# Patient Record
Sex: MALE | Race: WHITE | NOT HISPANIC OR LATINO | Employment: FULL TIME | URBAN - METROPOLITAN AREA
[De-identification: names, ages, dates, MRNs, and addresses within clinical notes are randomized per-mention and may not be internally consistent; named-entity substitution may affect disease eponyms.]

---

## 2025-03-24 ENCOUNTER — OFFICE VISIT (OUTPATIENT)
Dept: URGENT CARE | Age: 30
End: 2025-03-24
Payer: COMMERCIAL

## 2025-03-24 VITALS
WEIGHT: 155 LBS | HEART RATE: 64 BPM | RESPIRATION RATE: 16 BRPM | OXYGEN SATURATION: 99 % | BODY MASS INDEX: 22.19 KG/M2 | TEMPERATURE: 98.7 F | HEIGHT: 70 IN | DIASTOLIC BLOOD PRESSURE: 82 MMHG | SYSTOLIC BLOOD PRESSURE: 127 MMHG

## 2025-03-24 DIAGNOSIS — R07.9 CHEST PAIN, UNSPECIFIED TYPE: Primary | ICD-10-CM

## 2025-03-24 PROBLEM — R00.2 PALPITATIONS: Status: ACTIVE | Noted: 2025-03-24

## 2025-03-24 PROCEDURE — 99204 OFFICE O/P NEW MOD 45 MIN: CPT | Performed by: PHYSICIAN ASSISTANT

## 2025-03-24 PROCEDURE — 3008F BODY MASS INDEX DOCD: CPT | Performed by: PHYSICIAN ASSISTANT

## 2025-03-24 ASSESSMENT — PATIENT HEALTH QUESTIONNAIRE - PHQ9
2. FEELING DOWN, DEPRESSED OR HOPELESS: NOT AT ALL
SUM OF ALL RESPONSES TO PHQ9 QUESTIONS 1 AND 2: 0
1. LITTLE INTEREST OR PLEASURE IN DOING THINGS: NOT AT ALL

## 2025-03-24 ASSESSMENT — PAIN SCALES - GENERAL: PAINLEVEL_OUTOF10: 2

## 2025-03-24 NOTE — PROGRESS NOTES
"Subjective   Patient ID: Barber Agustin is a 29 y.o. male. They present today with a chief complaint of Chest Pain (X1 week pain is constant ).    History of Present Illness    Chest Pain    29-year-old patient presents to clinic with complaints of anterior fold chest pain with associated intermittent shortness of breath and palpitations ongoing for the past week which comes and goes and lasts about 1 to 3 hours and can be found at rest and with activity.   Reports this has happened in the past since 2021 and generally episodes present around February and March.  Reports has seen a cardiologist in the past and has had normal echo.  Reports most recent episode was this morning about 30 minutes ago.    Reports has tried antiacids as episodes generally occur worse at night.  Reports antiacids sometimes help and sometimes do not help. Denies illegal drug use, excessive caffeine, history of cardiac problems, history of arrhythmias, family history of cardiac problems, sudden cardiac death in the family, unilateral neuro weakness, vision changes, nausea, vomiting, diaphoresis.  Past Medical History  Allergies as of 03/24/2025    (No Known Allergies)       (Not in a hospital admission)       History reviewed. No pertinent past medical history.    History reviewed. No pertinent surgical history.     reports that he has been smoking cigarettes. He uses smokeless tobacco.    Review of Systems         ROS negative with the exception as noted on HPI                         Objective    Vitals:    03/24/25 0819   BP: 127/82   BP Location: Left arm   Patient Position: Sitting   BP Cuff Size: Adult   Pulse: 64   Resp: 16   Temp: 37.1 °C (98.7 °F)   SpO2: 99%   Weight: 70.3 kg (155 lb)   Height: 1.778 m (5' 10\")     No LMP for male patient.    Physical Exam  Constitutional:       Appearance: Normal appearance.   HENT:      Head: Normocephalic and atraumatic.   Neck:      Vascular: No carotid bruit.   Cardiovascular:      Rate and " Rhythm: Normal rate and regular rhythm.      Pulses:           Carotid pulses are 2+ on the right side and 2+ on the left side.       Radial pulses are 2+ on the right side and 2+ on the left side.        Dorsalis pedis pulses are 2+ on the right side and 2+ on the left side.      Heart sounds: Normal heart sounds. No murmur heard.  Pulmonary:      Effort: Pulmonary effort is normal.      Breath sounds: Normal breath sounds and air entry. No stridor, decreased air movement or transmitted upper airway sounds. No decreased breath sounds, wheezing, rhonchi or rales.   Musculoskeletal:      Right lower leg: No edema.      Left lower leg: No edema.   Skin:     General: Skin is warm and dry.   Neurological:      General: No focal deficit present.      Mental Status: He is alert and oriented to person, place, and time.      Cranial Nerves: No cranial nerve deficit.         Procedures    Point of Care Test & Imaging Results from this visit  No results found for this visit on 03/24/25.   No results found.    Diagnostic study results (if any) were reviewed by Meggan Donnelly PA-C.    Assessment/Plan   Allergies, medications, history, and pertinent labs/EKGs/Imaging reviewed by Meggan Donnelly PA-C.   anterior fold chest pain with associated intermittent shortness of breath and palpitations ongoing for the past week which comes and goes and lasts about 1 to 3 hours and can be found at rest and with activity. ECG performed in clinic and reviewed with pt. ECG results:  Bradycardia at a rate of 58.  No P wave abnormalities.  No ST elevations or depressions.  No axis deviation.   Unchanged from previous ECG. No illegal drug use. No excessive caffeine. Discussed with pt. If pt. experiences worsening or changing chest pain, shortness of breath, palpitations, dizziness, pre-syncope, syncope, diaphoresis pt. Should call 911 or proceed to the ED immediately. Cardiac referral provided. F/U with cardiology encouraged. PCP  referral placed. Case discussed with supervising physician.   Medical Decision Making      Orders and Diagnoses  Diagnoses and all orders for this visit:  Chest pain, unspecified type  -     ECG 12 lead (Clinic Performed)  -     Referral to Cardiology; Future      Medical Admin Record      Patient disposition: Home    Electronically signed by Meggan Donnelly PA-C  9:17 AM

## 2025-03-24 NOTE — PATIENT INSTRUCTIONS
If you experience any of the following call 911 or proceed to the ED immediately  -Pain, pressure, or discomfort in the center of the chest  -Pain or discomfort in other parts of the upper body, including the shoulders, arms, back, neck, jaw, or stomach  -Shortness of breath  -Nausea, vomiting, burping, or heartburn  -Sweating or having cold, clammy skin  -Racing or uneven heart rate  -Feeling dizzy or lightheaded, or even fainting

## 2025-03-25 ENCOUNTER — TELEMEDICINE (OUTPATIENT)
Dept: PRIMARY CARE | Facility: CLINIC | Age: 30
End: 2025-03-25
Payer: COMMERCIAL

## 2025-03-25 ENCOUNTER — OFFICE VISIT (OUTPATIENT)
Dept: CARDIOLOGY | Facility: CLINIC | Age: 30
End: 2025-03-25
Payer: COMMERCIAL

## 2025-03-25 VITALS
HEIGHT: 70 IN | HEART RATE: 76 BPM | BODY MASS INDEX: 22.33 KG/M2 | WEIGHT: 156 LBS | SYSTOLIC BLOOD PRESSURE: 127 MMHG | TEMPERATURE: 98.2 F | DIASTOLIC BLOOD PRESSURE: 83 MMHG

## 2025-03-25 DIAGNOSIS — R00.2 PALPITATIONS: Primary | ICD-10-CM

## 2025-03-25 DIAGNOSIS — R07.89 ATYPICAL CHEST PAIN: ICD-10-CM

## 2025-03-25 DIAGNOSIS — K21.9 GASTROESOPHAGEAL REFLUX DISEASE, UNSPECIFIED WHETHER ESOPHAGITIS PRESENT: Primary | ICD-10-CM

## 2025-03-25 PROCEDURE — 99214 OFFICE O/P EST MOD 30 MIN: CPT | Performed by: INTERNAL MEDICINE

## 2025-03-25 PROCEDURE — 99204 OFFICE O/P NEW MOD 45 MIN: CPT | Performed by: INTERNAL MEDICINE

## 2025-03-25 PROCEDURE — 3008F BODY MASS INDEX DOCD: CPT | Performed by: INTERNAL MEDICINE

## 2025-03-25 PROCEDURE — 99214 OFFICE O/P EST MOD 30 MIN: CPT | Performed by: NURSE PRACTITIONER

## 2025-03-25 RX ORDER — PANTOPRAZOLE SODIUM 40 MG/1
40 TABLET, DELAYED RELEASE ORAL DAILY
Qty: 30 TABLET | Refills: 0 | Status: SHIPPED | OUTPATIENT
Start: 2025-03-25 | End: 2025-04-24

## 2025-03-25 ASSESSMENT — ENCOUNTER SYMPTOMS
BELCHING: 1
HEARTBURN: 1
NAUSEA: 1

## 2025-03-25 NOTE — PATIENT INSTRUCTIONS
A prescription was sent to your pharmacy. Your pharmacist can answer any questions or concerns you may have    Follow up in office    Try to identify triggers    GERD or acid reflux occurs when stomach contents back up into the esophagus.Symptoms can include heartburn, chest pain, pain with swallowing, stomach pain, nausea/vomiting, and a sense of a lump in the throat.   Treatment can include medication and/or lifestyle changes such as;   Losing weight if you are overweight  Avoiding foods that trigger symptoms such as caffeine, chocolate, alcohol, citrus, fatty foods, spicy foods, chocolate  Quitting smoking, saliva helps neutralize refluxed acid but  smoking reduces the amount of saliva in the mouth and throat.It also causes coughing which aggravates reflux.  Avoid late meals  Avoid tight clothing around the abdomen  Raise the head of your bed 6 to 8 inches  Avoid laying down 2 hours from mealtime

## 2025-03-25 NOTE — PROGRESS NOTES
Chief Complaint:   Chest Pain     History of Present Illness     Barber Agustin is a 29 y.o. male presenting with chest pain.  The patient complains that for the past 4 years (2021) , they have experienced dull non-radiating both ride sided and substernal chest discomfort that lasts hrs to all day and sharp CP lasting seconds.  Seen by cardiology for these symptoms and had Echo at [facility] told normal. The discomfort is exacerbated by none and relieved by none.  The discomfort is not changing.  The patient does not experience associated shortness of breath, nausea, vomiting or diaphoresis.  The pain is not positional and can be reproduced by palpation.  The patient has no history of known coronary artery disease.  The patient has not had a stress test within the last 12 months and has never had cardiac catheterization.  The patient is very active and exercises and does not experience chest discomfort with exertion.  There is no history of aortic aneurysm or thromboembolism.  The patient denies any systemic complaints including fever. No Fhx of CAD.  Also has had chronic palpitations (irregularity) with sharp CP. No Hx HTN, DM, HPL.  Non-smoker.    Review of Systems  All pertinent systems have been reviewed and are negative except for what is stated in the history of present illness.    All other systems have been reviewed and are negative and noncontributory to this patient's current ailments.   .       Previous History     Past Medical History:  He has a past medical history of Atypical chest pain (03/25/2025) and Palpitations (03/24/2025).    Past Surgical History:  He has no past surgical history on file.      Social History:  He reports that he has quit smoking. His smoking use included cigarettes. He uses smokeless tobacco. No history on file for alcohol use and drug use.    Family History:  No family history on file.     Allergies:  Patient has no known allergies.    Outpatient Medications:  No current outpatient  "medications    Physical Examination   Vitals:  Visit Vitals  /83 (BP Location: Right arm)   Pulse 76   Temp 36.8 °C (98.2 °F)   Ht 1.778 m (5' 10\")   Wt 70.8 kg (156 lb)   BMI 22.38 kg/m²   Smoking Status Former   BSA 1.87 m²    Physical Exam  Vitals reviewed.   Constitutional:       General: He is not in acute distress.     Appearance: Normal appearance.   HENT:      Head: Normocephalic and atraumatic.      Nose: Nose normal.   Eyes:      Conjunctiva/sclera: Conjunctivae normal.   Cardiovascular:      Rate and Rhythm: Normal rate and regular rhythm.      Pulses: Normal pulses.      Heart sounds: No murmur heard.  Pulmonary:      Effort: Pulmonary effort is normal. No respiratory distress.      Breath sounds: Normal breath sounds. No wheezing, rhonchi or rales.   Abdominal:      General: Bowel sounds are normal. There is no distension.      Palpations: Abdomen is soft.      Tenderness: There is no abdominal tenderness.   Musculoskeletal:         General: No swelling.      Right lower leg: No edema.      Left lower leg: No edema.   Skin:     General: Skin is warm and dry.      Capillary Refill: Capillary refill takes less than 2 seconds.   Neurological:      General: No focal deficit present.      Mental Status: He is alert.   Psychiatric:         Mood and Affect: Mood normal.             Labs/Imaging/Cardiac Studies   I have personally reviewed the patient's available lab work, primary care appointment notes, pertinent imaging studies, and cardiac studies and have discussed them and my independent interpretation of those results with the patient and caregiver at this appointment.  All pertinent recent Emergency Department evaluations and Hospital admissions were also reviewed in detail with the patient and caregiver.    Reviewed ECG  Reviewed Echo 2022 Normal  Reviewed UC note    Echo:  No echocardiogram results found for the past 12 months       Assessment and Recommendations     Assessment/Plan       1. " Palpitations (Primary)  Chronic, associated with non-cardiac CP.    2. Atypical chest pain  This has been chronic and previously evaluated with ECG and Echo (normal) and his symptoms are similar to what he had in 2021 and 2022.  These are non-cardiac.  Likely musculoskeletal.  See PCP.  His symptoms have resolved for a year and usually are only present in Spring.             Ozzie Tsai MD    Exclusive of any other services or procedures performed, I, Ozzie Tsai MD , spent 30 minutes in duration for this visit today.  This time consisted of chart review, obtaining history, and/or performing the exam as documented above as well as documenting the clinical information for the encounter in the electronic record, discussing treatment options, plans, and/or goals with patient, family, and/or caregiver, refilling medications, updating the electronic record, ordering medicines, lab work, imaging, referrals, and/or procedures as documented above and communicating with other [facility] professionals. I have discussed the results of laboratory, radiology, and cardiology studies with the patient and their family/caregiver.

## 2025-03-25 NOTE — PROGRESS NOTES
Subjective   Patient ID: Barber Agustin is a 29 y.o. male who presents for a Virtual Visit GERD.    GERD  He complains of belching, chest pain, heartburn and nausea. also endorses chronic back pain. This is a recurrent problem. The current episode started 1 to 4 weeks ago (Has had on and off since 2021). The problem occurs frequently. The problem has been unchanged. The heartburn duration is an hour. The heartburn is located in the substernum and right chest. The heartburn is of moderate intensity. The heartburn wakes him from sleep. The heartburn limits his activity. The symptoms are aggravated by exertion, lying down and certain foods. Risk factors include caffeine use. He has tried a diet change for the symptoms. The treatment provided no relief.   He was recently evaluated by Cardiology (today), notes reviewed, no concern for pain being cardiac in nature  Works as a [employer]  Denies trauma or injury, no exacerbating factor he is aware of      Review of Systems   Cardiovascular:  Positive for chest pain.   Gastrointestinal:  Positive for heartburn and nausea.       Physical Exam not performed  E-visit questionnaire reviewed and discussed with patient.   All questions answered    Assessment/Plan   Problem List Items Addressed This Visit             ICD-10-CM    Gastroesophageal reflux disease - Primary K21.9    Relevant Medications    pantoprazole (ProtoNix) 40 mg EC tablet     Discussed with patient   Verbalized understanding, Teach back utilized  Recommend follow up with me as scheduled

## 2025-04-02 ENCOUNTER — APPOINTMENT (OUTPATIENT)
Dept: CARDIOLOGY | Facility: CLINIC | Age: 30
End: 2025-04-02

## 2025-04-09 ENCOUNTER — APPOINTMENT (OUTPATIENT)
Dept: RADIOLOGY | Facility: HOSPITAL | Age: 30
End: 2025-04-09
Payer: COMMERCIAL

## 2025-04-09 ENCOUNTER — APPOINTMENT (OUTPATIENT)
Dept: CARDIOLOGY | Facility: HOSPITAL | Age: 30
End: 2025-04-09
Payer: COMMERCIAL

## 2025-04-09 ENCOUNTER — HOSPITAL ENCOUNTER (EMERGENCY)
Facility: HOSPITAL | Age: 30
Discharge: HOME | End: 2025-04-09
Attending: EMERGENCY MEDICINE
Payer: COMMERCIAL

## 2025-04-09 VITALS
OXYGEN SATURATION: 98 % | SYSTOLIC BLOOD PRESSURE: 140 MMHG | WEIGHT: 155 LBS | HEIGHT: 70 IN | DIASTOLIC BLOOD PRESSURE: 78 MMHG | TEMPERATURE: 98.8 F | HEART RATE: 72 BPM | BODY MASS INDEX: 22.19 KG/M2 | RESPIRATION RATE: 18 BRPM

## 2025-04-09 DIAGNOSIS — R07.9 CHEST PAIN, UNSPECIFIED TYPE: Primary | ICD-10-CM

## 2025-04-09 DIAGNOSIS — E87.6 HYPOKALEMIA: ICD-10-CM

## 2025-04-09 LAB
ANION GAP SERPL CALC-SCNC: 10 MMOL/L (ref 10–20)
BASOPHILS # BLD AUTO: 0.02 X10*3/UL (ref 0–0.1)
BASOPHILS NFR BLD AUTO: 0.3 %
BUN SERPL-MCNC: 21 MG/DL (ref 6–23)
CALCIUM SERPL-MCNC: 9.6 MG/DL (ref 8.6–10.3)
CARDIAC TROPONIN I PNL SERPL HS: <3 NG/L (ref 0–20)
CHLORIDE SERPL-SCNC: 106 MMOL/L (ref 98–107)
CO2 SERPL-SCNC: 27 MMOL/L (ref 21–32)
CREAT SERPL-MCNC: 0.98 MG/DL (ref 0.5–1.3)
EGFRCR SERPLBLD CKD-EPI 2021: >90 ML/MIN/1.73M*2
EOSINOPHIL # BLD AUTO: 0.02 X10*3/UL (ref 0–0.7)
EOSINOPHIL NFR BLD AUTO: 0.3 %
ERYTHROCYTE [DISTWIDTH] IN BLOOD BY AUTOMATED COUNT: 11.9 % (ref 11.5–14.5)
FLUAV RNA RESP QL NAA+PROBE: NOT DETECTED
FLUBV RNA RESP QL NAA+PROBE: NOT DETECTED
GLUCOSE SERPL-MCNC: 92 MG/DL (ref 74–99)
HCT VFR BLD AUTO: 47 % (ref 41–52)
HGB BLD-MCNC: 15.3 G/DL (ref 13.5–17.5)
IMM GRANULOCYTES # BLD AUTO: 0.02 X10*3/UL (ref 0–0.7)
IMM GRANULOCYTES NFR BLD AUTO: 0.3 % (ref 0–0.9)
LYMPHOCYTES # BLD AUTO: 1.32 X10*3/UL (ref 1.2–4.8)
LYMPHOCYTES NFR BLD AUTO: 17.7 %
MAGNESIUM SERPL-MCNC: 2.25 MG/DL (ref 1.6–2.4)
MCH RBC QN AUTO: 29.4 PG (ref 26–34)
MCHC RBC AUTO-ENTMCNC: 32.6 G/DL (ref 32–36)
MCV RBC AUTO: 90 FL (ref 80–100)
MONOCYTES # BLD AUTO: 0.54 X10*3/UL (ref 0.1–1)
MONOCYTES NFR BLD AUTO: 7.2 %
NEUTROPHILS # BLD AUTO: 5.54 X10*3/UL (ref 1.2–7.7)
NEUTROPHILS NFR BLD AUTO: 74.2 %
NRBC BLD-RTO: 0 /100 WBCS (ref 0–0)
PLATELET # BLD AUTO: 273 X10*3/UL (ref 150–450)
POTASSIUM SERPL-SCNC: 3.4 MMOL/L (ref 3.5–5.3)
RBC # BLD AUTO: 5.21 X10*6/UL (ref 4.5–5.9)
SARS-COV-2 RNA RESP QL NAA+PROBE: NOT DETECTED
SODIUM SERPL-SCNC: 140 MMOL/L (ref 136–145)
WBC # BLD AUTO: 7.5 X10*3/UL (ref 4.4–11.3)

## 2025-04-09 PROCEDURE — 87636 SARSCOV2 & INF A&B AMP PRB: CPT | Performed by: NURSE PRACTITIONER

## 2025-04-09 PROCEDURE — 71046 X-RAY EXAM CHEST 2 VIEWS: CPT

## 2025-04-09 PROCEDURE — 2500000002 HC RX 250 W HCPCS SELF ADMINISTERED DRUGS (ALT 637 FOR MEDICARE OP, ALT 636 FOR OP/ED): Performed by: EMERGENCY MEDICINE

## 2025-04-09 PROCEDURE — 82374 ASSAY BLOOD CARBON DIOXIDE: CPT | Performed by: NURSE PRACTITIONER

## 2025-04-09 PROCEDURE — 84484 ASSAY OF TROPONIN QUANT: CPT | Performed by: NURSE PRACTITIONER

## 2025-04-09 PROCEDURE — 83735 ASSAY OF MAGNESIUM: CPT | Performed by: NURSE PRACTITIONER

## 2025-04-09 PROCEDURE — 93005 ELECTROCARDIOGRAM TRACING: CPT

## 2025-04-09 PROCEDURE — 85025 COMPLETE CBC W/AUTO DIFF WBC: CPT | Performed by: NURSE PRACTITIONER

## 2025-04-09 PROCEDURE — 99285 EMERGENCY DEPT VISIT HI MDM: CPT | Mod: 25 | Performed by: EMERGENCY MEDICINE

## 2025-04-09 PROCEDURE — 36415 COLL VENOUS BLD VENIPUNCTURE: CPT | Performed by: NURSE PRACTITIONER

## 2025-04-09 PROCEDURE — 71046 X-RAY EXAM CHEST 2 VIEWS: CPT | Mod: FOREIGN READ | Performed by: RADIOLOGY

## 2025-04-09 RX ORDER — POTASSIUM CHLORIDE 20 MEQ/1
20 TABLET, EXTENDED RELEASE ORAL ONCE
Status: COMPLETED | OUTPATIENT
Start: 2025-04-09 | End: 2025-04-09

## 2025-04-09 RX ADMIN — POTASSIUM CHLORIDE 20 MEQ: 1500 TABLET, EXTENDED RELEASE ORAL at 19:00

## 2025-04-09 ASSESSMENT — COLUMBIA-SUICIDE SEVERITY RATING SCALE - C-SSRS
1. IN THE PAST MONTH, HAVE YOU WISHED YOU WERE DEAD OR WISHED YOU COULD GO TO SLEEP AND NOT WAKE UP?: NO
2. HAVE YOU ACTUALLY HAD ANY THOUGHTS OF KILLING YOURSELF?: NO
6. HAVE YOU EVER DONE ANYTHING, STARTED TO DO ANYTHING, OR PREPARED TO DO ANYTHING TO END YOUR LIFE?: NO

## 2025-04-09 ASSESSMENT — PAIN DESCRIPTION - DESCRIPTORS: DESCRIPTORS: DISCOMFORT

## 2025-04-09 NOTE — ED TRIAGE NOTES
Secondary to patient volumes and overcrowding, I performed a brief medical screening exam of the patient in triage, as the patient awaits space in the main ED.    History of Present Illness:  Barber Agustin presents with   Chief Complaint   Patient presents with    Chest Pain    Weakness, Gen       Physical Exam:  General - In no acute distress  Respiratory - Breathing comfortably  Cardiac - Normal S1, S2, no m/g/r  Neuro - No focal neurologic deficits. Cranial nerves normal as tested from III through XII.  Bilateral upper and lower extremity strengths intact 5/5, sensation intact, DTRs 2+, no drift.  No neglect, no dysarthria, no word finding abnormality, finger-to-nose normal, heel-to-shin normal.   Eyes - EOMI, PERRL.    Medical Decision Making:  Patient will require further evaluation in the main ED.    Initial diagnostic tests were ordered from triage.    The patient demonstrates understanding that this initial evaluation is a brief medical screening exam and the expectation is that they await for space in the main ED to be further evaluated.  The patient understands that, if they leave prior to further evaluation in the main ED after this initial evaluation in triage, they are doing so under their own accord knowing that their evaluation/work-up is not yet complete. The patient also understands that any preliminary diagnostic results, including abnormalities, may not be shared with them, if they choose to leave prior to further evaluation in the main ED.

## 2025-04-09 NOTE — ED TRIAGE NOTES
Pt presents to ED c/o intermittent chest pains for the past month. Pt reports brain fog and left arm tingling that began around 1130 today. Pt reports the left arm tingling has occurred intermittently over the past month. Pt denies numbness/tingling to other extremities. Pt states left arm tingling dissipated during the time of triage. Pt reports nausea. Denies abdominal pain, CP, vomiting, diarrhea at the time of triage.

## 2025-04-11 LAB
ATRIAL RATE: 75 BPM
P AXIS: 80 DEGREES
P OFFSET: 208 MS
P ONSET: 148 MS
PR INTERVAL: 152 MS
Q ONSET: 224 MS
QRS COUNT: 13 BEATS
QRS DURATION: 112 MS
QT INTERVAL: 386 MS
QTC CALCULATION(BAZETT): 431 MS
QTC FREDERICIA: 415 MS
R AXIS: 84 DEGREES
T AXIS: 76 DEGREES
T OFFSET: 417 MS
VENTRICULAR RATE: 75 BPM

## 2025-04-12 NOTE — ED PROVIDER NOTES
HPI   Chief Complaint   Patient presents with    Chest Pain    Weakness, Gen       HPI  Patient is a 29-year-old male presenting to the ED today for chest pain and nausea.  Patient states that he has had intermittent chest pain for the past several years now.  He has been seen by multiple physicians as well as followed up with cardiology.  However, he states that because he keeps changing jobs and insurance over the past several years, he keeps having to start fresh with new PCPs.  He states that he has never been diagnosed with anything and still does not know why he has these episodes of chest pain.  Today while eating lunch however, he started developing brain fog as well as pain and tingling that radiated into his left arm.  He reports associated nausea.  Because of these symptoms, he came to the ED for further evaluation.  At this time, his symptoms have significantly improved.  He does report some mild nausea currently, but denies any current chest pain or tingling of his extremities.  He denies any abdominal pain, vomiting, or diarrhea.  He denies any recent fever, cough, shortness of breath.      Patient History   Past Medical History:   Diagnosis Date    Atypical chest pain 03/25/2025    Palpitations 03/24/2025     History reviewed. No pertinent surgical history.  No family history on file.  Social History     Tobacco Use    Smoking status: Former     Types: Cigarettes    Smokeless tobacco: Current   Substance Use Topics    Alcohol use: Not on file    Drug use: Not on file       Physical Exam   ED Triage Vitals [04/09/25 1336]   Temperature Heart Rate Respirations BP   37.1 °C (98.8 °F) 72 18 140/78      Pulse Ox Temp Source Heart Rate Source Patient Position   98 % Tympanic Monitor Sitting      BP Location FiO2 (%)     Right arm --       Physical Exam  Vitals and nursing note reviewed.   Constitutional:       General: He is not in acute distress.     Appearance: He is not toxic-appearing.   HENT:       Head: Normocephalic.      Mouth/Throat:      Mouth: Mucous membranes are moist.   Eyes:      Extraocular Movements: Extraocular movements intact.      Conjunctiva/sclera: Conjunctivae normal.   Cardiovascular:      Rate and Rhythm: Normal rate and regular rhythm.      Pulses: Normal pulses.   Pulmonary:      Effort: Pulmonary effort is normal. No respiratory distress.      Breath sounds: Normal breath sounds. No wheezing.   Abdominal:      General: There is no distension.      Palpations: Abdomen is soft.      Tenderness: There is no abdominal tenderness.   Musculoskeletal:         General: No swelling.      Cervical back: Neck supple.   Skin:     General: Skin is warm and dry.      Capillary Refill: Capillary refill takes less than 2 seconds.   Neurological:      General: No focal deficit present.      Mental Status: He is alert. Mental status is at baseline.           ED Course & MDM   ED Course as of 04/12/25 0128 Wed Apr 09, 2025   1800 EKG obtained at 1338, interpreted by myself.  Normal sinus rhythm with a ventricular rate of 75, no axis deviation, normal intervals, with no acute ischemic changes [VT]      ED Course User Index  [VT] Stefanie KEMP MD         Diagnoses as of 04/12/25 0128   Chest pain, unspecified type   Hypokalemia             No data recorded     [place] Coma Scale Score: 15 (04/09/25 1655 : Lucinda Bailey RN)                       Medical Decision Making  Patient was seen and evaluated for chest pain.  Differential diagnosis includes but is not limited to ACS, Unstable angina, Aortic Dissection, GERD, Viral Infection, MSK Pain, Costochondritis.  Initial EKG does not show any acute ischemic changes.  Additional labs and imaging are ordered for further evaluation of the patient's symptoms.    CBC is unremarkable.  BMP shows mild hypokalemia with a potassium of 3.4, otherwise unremarkable.  Potassium is replaced with 20 mEq KCl orally.  Magnesium is normal at 2.25.  High-sensitivity troponin  is negative.  Influenza and COVID swabs are negative.    XR chest 2 views   Final Result   No acute cardiopulmonary disease.   Signed by Tony Pena MD        Patient was informed of their lab and imaging results, and all questions and concerns were answered.  Patient has a heart score of 0.  Discharge planning with close outpatient follow-up was discussed at this time, to which the patient was agreeable. Strict return precautions were given, and patient was discharged home in stable condition.      Procedure  Procedures     Stefanie KEMP MD  04/12/25 0136

## 2025-04-21 ENCOUNTER — APPOINTMENT (OUTPATIENT)
Dept: PRIMARY CARE | Facility: CLINIC | Age: 30
End: 2025-04-21
Payer: COMMERCIAL

## 2025-04-21 VITALS
HEIGHT: 70 IN | DIASTOLIC BLOOD PRESSURE: 70 MMHG | BODY MASS INDEX: 22.62 KG/M2 | SYSTOLIC BLOOD PRESSURE: 125 MMHG | TEMPERATURE: 98.2 F | OXYGEN SATURATION: 98 % | WEIGHT: 158 LBS | HEART RATE: 80 BPM

## 2025-04-21 DIAGNOSIS — R07.9 CHEST PAIN, UNSPECIFIED TYPE: ICD-10-CM

## 2025-04-21 DIAGNOSIS — R07.89 ATYPICAL CHEST PAIN: ICD-10-CM

## 2025-04-21 DIAGNOSIS — K21.9 GASTROESOPHAGEAL REFLUX DISEASE, UNSPECIFIED WHETHER ESOPHAGITIS PRESENT: ICD-10-CM

## 2025-04-21 DIAGNOSIS — Z11.4 SCREENING FOR HIV WITHOUT PRESENCE OF RISK FACTORS: ICD-10-CM

## 2025-04-21 DIAGNOSIS — Z00.00 ANNUAL PHYSICAL EXAM: Primary | ICD-10-CM

## 2025-04-21 DIAGNOSIS — Z11.59 ENCOUNTER FOR HEPATITIS C SCREENING TEST FOR LOW RISK PATIENT: ICD-10-CM

## 2025-04-21 PROBLEM — S62.031K: Status: ACTIVE | Noted: 2023-01-09

## 2025-04-21 PROCEDURE — 99395 PREV VISIT EST AGE 18-39: CPT | Performed by: NURSE PRACTITIONER

## 2025-04-21 PROCEDURE — 3008F BODY MASS INDEX DOCD: CPT | Performed by: NURSE PRACTITIONER

## 2025-04-21 RX ORDER — PANTOPRAZOLE SODIUM 40 MG/1
40 TABLET, DELAYED RELEASE ORAL DAILY
Qty: 30 TABLET | Refills: 0 | Status: SHIPPED | OUTPATIENT
Start: 2025-04-21 | End: 2025-05-21

## 2025-04-21 RX ORDER — SERTRALINE HYDROCHLORIDE 50 MG/1
50 TABLET, FILM COATED ORAL DAILY
COMMUNITY

## 2025-04-21 ASSESSMENT — PAIN SCALES - GENERAL: PAINLEVEL_OUTOF10: 2

## 2025-04-21 ASSESSMENT — PATIENT HEALTH QUESTIONNAIRE - PHQ9
1. LITTLE INTEREST OR PLEASURE IN DOING THINGS: NEARLY EVERY DAY
4. FEELING TIRED OR HAVING LITTLE ENERGY: NEARLY EVERY DAY
3. TROUBLE FALLING OR STAYING ASLEEP OR SLEEPING TOO MUCH: NEARLY EVERY DAY
5. POOR APPETITE OR OVEREATING: SEVERAL DAYS
SUM OF ALL RESPONSES TO PHQ9 QUESTIONS 1 AND 2: 4
9. THOUGHTS THAT YOU WOULD BE BETTER OFF DEAD, OR OF HURTING YOURSELF: NOT AT ALL
6. FEELING BAD ABOUT YOURSELF - OR THAT YOU ARE A FAILURE OR HAVE LET YOURSELF OR YOUR FAMILY DOWN: SEVERAL DAYS
8. MOVING OR SPEAKING SO SLOWLY THAT OTHER PEOPLE COULD HAVE NOTICED. OR THE OPPOSITE, BEING SO FIGETY OR RESTLESS THAT YOU HAVE BEEN MOVING AROUND A LOT MORE THAN USUAL: NOT AT ALL
2. FEELING DOWN, DEPRESSED OR HOPELESS: SEVERAL DAYS
7. TROUBLE CONCENTRATING ON THINGS, SUCH AS READING THE NEWSPAPER OR WATCHING TELEVISION: SEVERAL DAYS
10. IF YOU CHECKED OFF ANY PROBLEMS, HOW DIFFICULT HAVE THESE PROBLEMS MADE IT FOR YOU TO DO YOUR WORK, TAKE CARE OF THINGS AT HOME, OR GET ALONG WITH OTHER PEOPLE: VERY DIFFICULT
SUM OF ALL RESPONSES TO PHQ QUESTIONS 1-9: 13

## 2025-04-21 ASSESSMENT — ENCOUNTER SYMPTOMS
LOSS OF SENSATION IN FEET: 0
DEPRESSION: 0
OCCASIONAL FEELINGS OF UNSTEADINESS: 0

## 2025-04-21 ASSESSMENT — ANXIETY QUESTIONNAIRES
IF YOU CHECKED OFF ANY PROBLEMS ON THIS QUESTIONNAIRE, HOW DIFFICULT HAVE THESE PROBLEMS MADE IT FOR YOU TO DO YOUR WORK, TAKE CARE OF THINGS AT HOME, OR GET ALONG WITH OTHER PEOPLE: VERY DIFFICULT
6. BECOMING EASILY ANNOYED OR IRRITABLE: SEVERAL DAYS
4. TROUBLE RELAXING: NEARLY EVERY DAY
2. NOT BEING ABLE TO STOP OR CONTROL WORRYING: MORE THAN HALF THE DAYS
1. FEELING NERVOUS, ANXIOUS, OR ON EDGE: NEARLY EVERY DAY
7. FEELING AFRAID AS IF SOMETHING AWFUL MIGHT HAPPEN: SEVERAL DAYS
GAD7 TOTAL SCORE: 13
3. WORRYING TOO MUCH ABOUT DIFFERENT THINGS: NEARLY EVERY DAY
5. BEING SO RESTLESS THAT IT IS HARD TO SIT STILL: NOT AT ALL

## 2025-04-21 NOTE — PATIENT INSTRUCTIONS
Labs will be released to My Chart or if you are not connected you will be notified of abnormals only    GERD or acid reflux occurs when stomach contents back up into the esophagus.Symptoms can include heartburn, chest pain, pain with swallowing, stomach pain, nausea/vomiting, and a sense of a lump in the throat.   Treatment can include medication and/or lifestyle changes such as;   Losing weight if you are overweight  Avoiding foods that trigger symptoms such as caffeine, chocolate, alcohol, citrus, fatty foods, spicy foods, chocolate  Quitting smoking, saliva helps neutralize refluxed acid but  smoking reduces the amount of saliva in the mouth and throat.It also causes coughing which aggravates reflux.  Avoid late meals  Avoid tight clothing around the abdomen  Raise the head of your bed 6 to 8 inches  Avoid laying down 2 hours from mealtime    Anxiety  Experiencing occasional anxiety is a normal part of life.   People with anxiety disorders frequently have intense, excessive and persistent worry and fear about everyday situations. Often, anxiety disorders involve repeated episodes of sudden feelings of intense anxiety and fear or terror that reach a peak within minutes (panic attacks).    These feelings of anxiety and panic interfere with daily activities, are difficult to control, are out of proportion to the actual danger and can last a long time. You may avoid places or situations to prevent these feelings. Symptoms may start during childhood or the teen years and continue into adulthood.    Common anxiety signs and symptoms include:  Feeling nervous, restless or tense  Having a sense of impending danger, panic or doom  Having an increased heart rate  Breathing rapidly (hyperventilation), Sweating, Trembling  Feeling weak or tired  Trouble concentrating or thinking about anything other than the present worry, Having trouble sleeping  Experiencing gastrointestinal (GI) problems  Having difficulty controlling  worry  Having the urge to avoid things that trigger anxiety    Causes  The causes of anxiety disorders aren't fully understood. Life experiences such as traumatic events appear to trigger anxiety disorders in people who are already prone to anxiety. Inherited traits also can be a factor.  For some people, anxiety may be linked to an underlying health issue.     Complications  Having an anxiety disorder does more than make you worry. It can also lead to, or worsen, other mental and physical conditions, such as:  Depression (which often occurs with an anxiety disorder) or other mental health disorders  Substance misuse  Trouble sleeping (insomnia)  Digestive or bowel problems  Headaches and chronic pain  Social isolation  Problems functioning at school or work  Poor quality of life  Suicide    Prevention  There's no way to predict for certain what will cause someone to develop an anxiety disorder, but you can take steps to reduce the impact of symptoms if you're anxious:    Get help early. Anxiety, can be harder to treat if you wait.  Stay active.   Participate in activities that you enjoy and that make you feel good about yourself.   Enjoy social interaction and caring relationships, which can lessen your worries.  Avoid alcohol or drug use.     Treatment  The two main treatments for anxiety disorders are psychotherapy and medications. You may benefit most from a combination of the two. It may take some trial and error to discover which treatments work best for you.    Psychotherapy  Also known as talk therapy or psychological counseling  Cognitive behavioral therapy (CBT) is the most effective form of psychotherapy for anxiety disorders. Generally a short-term treatment, CBT focuses on teaching you specific skills to improve your symptoms and gradually return to the activities you've avoided because of anxiety.    Medications  Several types of medications are used to help relieve symptoms, depending on the type of  anxiety disorder you have and whether you also have other mental or physical health issues. For example:  Certain antidepressants are also used to treat anxiety disorders.  An anti-anxiety medication called buspirone may be prescribed.  In limited circumstances, your doctor may prescribe other types of medications

## 2025-04-21 NOTE — PROGRESS NOTES
Subjective   Patient ID: Barber Agustin is a 29 y.o. male who presents for New Patient Visit, Annual Exam, Chest Pain, and Panic Attack.    HPI  Pleasant 28 y/o male with PMH Anxiety, Atypical chest pain, presents to establish care and for Annual exam    Several concerns today  Anxiety- In the past took Lexapro, recently restarted Sertraline per Cardiology suggestion, unsure if it has made a difference as it has only been 1 week. Denies SI, HI. Sleeps ok. EVIE 7 Score 13, PHQ 9 Score 13. Does not have a counselor. Discussed CBT, managing symptoms  Handouts provided and reviewed with patient, discussed    Atypical Chest pain, GERD- has presented to the ED on several occasions with c/o chest pain, consider GERD component. Symptoms have been present for several years gradually worsening. Endorses feeling as though food gets stuck in throat, mid epigastric pain at times. Started pantoprazole as well as recently has been changing his diet with noted improvement    Cardiac work ups negative, most recently 4/9/2025  Evaluated by Cardiology in the past and suggested he restart his SSRI, suspect anxiety may be contributory  4/9/2025 Medical Decision Making  Patient was seen and evaluated for chest pain.  Differential diagnosis includes but is not limited to ACS, Unstable angina, Aortic Dissection, GERD, Viral Infection, MSK Pain, Costochondritis.  Initial EKG does not show any acute ischemic changes.  Additional labs and imaging are ordered for further evaluation of the patient's symptoms.   CBC is unremarkable.  BMP shows mild hypokalemia with a potassium of 3.4, otherwise unremarkable.  Potassium is replaced with 20 mEq KCl orally.  Magnesium is normal at 2.25.  High-sensitivity troponin is negative.  Influenza and COVID swabs are negative.     XR chest 2 views   Final Result   No acute cardiopulmonary disease.   Signed by Tony Pena MD     Single  Works as a [employer]  no  Kids    Diet better, chicken, salad, vegetables,  "normally avoids fast food  Caffeine recently stopped  Water 64 oz  Exercise 2-3 x week    Current Smoker vapes x 2 months, prior cigarettes at heaviest 1 ppd  x  11 years  Alcohol Social       Eye exam Lasik 2024  Dental exam >2 years    Family history non contributory    Review of Systems  Review of Systems   Constitutional: Negative.    HENT: Negative.     Respiratory: Negative.     Cardiovascular: HPI  Gastrointestinal: HPI    Genitourinary: Negative.    Musculoskeletal: Negative.    Psychiatric/Behavioral: HPI   All other systems reviewed and are negative.    .vsVisit Vitals  /70 (BP Location: Left arm, Patient Position: Sitting)   Pulse 80   Temp 36.8 °C (98.2 °F)   Ht 1.778 m (5' 10\")   Wt 71.7 kg (158 lb)   SpO2 98%   BMI 22.67 kg/m²   Smoking Status Former   BSA 1.88 m²       Objective   Physical Exam  Vitals reviewed.   Constitutional:       Appearance: Normal appearance.   HENT:      Head: Normocephalic and atraumatic.      Right Ear: Tympanic membrane, ear canal and external ear normal.      Left Ear: Tympanic membrane, ear canal and external ear normal.      Nose: Nose normal.      Mouth/Throat:      Pharynx: Oropharynx is clear.   Eyes:      Extraocular Movements: Extraocular movements intact.      Conjunctiva/sclera: Conjunctivae normal.      Pupils: Pupils are equal, round, and reactive to light.      Comments: Endorses dry eye   Cardiovascular:      Rate and Rhythm: Normal rate and regular rhythm.      Pulses: Normal pulses.      Heart sounds: Normal heart sounds.   Pulmonary:      Effort: Pulmonary effort is normal.      Breath sounds: Normal breath sounds. No wheezing, rhonchi or rales.   Chest:      Chest wall: No tenderness.   Abdominal:      General: Bowel sounds are normal. There is no distension.      Palpations: Abdomen is soft.      Tenderness: There is no abdominal tenderness. There is no guarding or rebound.      Hernia: No hernia is present.   Musculoskeletal:         General: No " swelling or tenderness. Normal range of motion.      Cervical back: Normal range of motion and neck supple.   Skin:     General: Skin is warm and dry.      Capillary Refill: Capillary refill takes 2 to 3 seconds.   Neurological:      General: No focal deficit present.      Mental Status: He is alert and oriented to person, place, and time.   Psychiatric:         Mood and Affect: Mood normal.         Behavior: Behavior normal.         Thought Content: Thought content normal.         Judgment: Judgment normal.         Assessment/Plan   Problem List Items Addressed This Visit       Atypical chest pain    Relevant Orders    Referral to Gastroenterology    Gastroesophageal reflux disease    Relevant Medications    pantoprazole (ProtoNix) 40 mg EC tablet    Other Relevant Orders    Referral to Gastroenterology    Annual physical exam - Primary    Relevant Orders    CBC    Comprehensive Metabolic Panel    Vitamin D 25-Hydroxy,Total (for eval of Vitamin D levels)    TSH with reflex to Free T4 if abnormal    Hemoglobin A1C    Lipid Panel    Referral to Gastroenterology    HIV 1/2 Antigen/Antibody Screen with Reflex to Confirmation    Hepatitis C antibody    Encounter for hepatitis C screening test for low risk patient    Relevant Orders    Hepatitis C antibody    Chest pain    Screening for HIV without presence of risk factors    Relevant Orders    HIV 1/2 Antigen/Antibody Screen with Reflex to Confirmation

## 2025-04-25 ENCOUNTER — CLINICAL SUPPORT (OUTPATIENT)
Dept: PRIMARY CARE | Facility: CLINIC | Age: 30
End: 2025-04-25
Payer: COMMERCIAL

## 2025-04-26 LAB
25(OH)D3+25(OH)D2 SERPL-MCNC: 34 NG/ML (ref 30–100)
ALBUMIN SERPL-MCNC: 4.8 G/DL (ref 3.6–5.1)
ALP SERPL-CCNC: 26 U/L (ref 36–130)
ALT SERPL-CCNC: 15 U/L (ref 9–46)
ANION GAP SERPL CALCULATED.4IONS-SCNC: 13 MMOL/L (CALC) (ref 7–17)
AST SERPL-CCNC: 14 U/L (ref 10–40)
BILIRUB SERPL-MCNC: 0.8 MG/DL (ref 0.2–1.2)
BUN SERPL-MCNC: 26 MG/DL (ref 7–25)
CALCIUM SERPL-MCNC: 9.5 MG/DL (ref 8.6–10.3)
CHLORIDE SERPL-SCNC: 104 MMOL/L (ref 98–110)
CHOLEST SERPL-MCNC: 144 MG/DL
CHOLEST/HDLC SERPL: 2.1 (CALC)
CO2 SERPL-SCNC: 21 MMOL/L (ref 20–32)
CREAT SERPL-MCNC: 1.08 MG/DL (ref 0.6–1.24)
EGFRCR SERPLBLD CKD-EPI 2021: 96 ML/MIN/1.73M2
ERYTHROCYTE [DISTWIDTH] IN BLOOD BY AUTOMATED COUNT: 12.5 % (ref 11–15)
EST. AVERAGE GLUCOSE BLD GHB EST-MCNC: 114 MG/DL
EST. AVERAGE GLUCOSE BLD GHB EST-SCNC: 6.3 MMOL/L
GLUCOSE SERPL-MCNC: 103 MG/DL (ref 65–99)
HBA1C MFR BLD: 5.6 %
HCT VFR BLD AUTO: 45.5 % (ref 38.5–50)
HCV AB SERPL QL IA: NORMAL
HDLC SERPL-MCNC: 70 MG/DL
HGB BLD-MCNC: 15.6 G/DL (ref 13.2–17.1)
HIV 1+2 AB+HIV1 P24 AG SERPL QL IA: NORMAL
LDLC SERPL CALC-MCNC: 60 MG/DL (CALC)
MCH RBC QN AUTO: 30.5 PG (ref 27–33)
MCHC RBC AUTO-ENTMCNC: 34.3 G/DL (ref 32–36)
MCV RBC AUTO: 89 FL (ref 80–100)
NONHDLC SERPL-MCNC: 74 MG/DL (CALC)
PLATELET # BLD AUTO: 283 THOUSAND/UL (ref 140–400)
PMV BLD REES-ECKER: 9.4 FL (ref 7.5–12.5)
POTASSIUM SERPL-SCNC: 4.5 MMOL/L (ref 3.5–5.3)
PROT SERPL-MCNC: 7.1 G/DL (ref 6.1–8.1)
RBC # BLD AUTO: 5.11 MILLION/UL (ref 4.2–5.8)
SODIUM SERPL-SCNC: 138 MMOL/L (ref 135–146)
TRIGL SERPL-MCNC: 60 MG/DL
TSH SERPL-ACNC: 1.75 MIU/L (ref 0.4–4.5)
WBC # BLD AUTO: 7.8 THOUSAND/UL (ref 3.8–10.8)

## 2025-04-30 ENCOUNTER — APPOINTMENT (OUTPATIENT)
Dept: GASTROENTEROLOGY | Facility: CLINIC | Age: 30
End: 2025-04-30
Payer: COMMERCIAL

## 2025-04-30 VITALS
BODY MASS INDEX: 22.33 KG/M2 | OXYGEN SATURATION: 99 % | WEIGHT: 156 LBS | DIASTOLIC BLOOD PRESSURE: 85 MMHG | SYSTOLIC BLOOD PRESSURE: 135 MMHG | HEIGHT: 70 IN | HEART RATE: 72 BPM

## 2025-04-30 DIAGNOSIS — Z00.00 ANNUAL PHYSICAL EXAM: ICD-10-CM

## 2025-04-30 DIAGNOSIS — Z12.11 COLON CANCER SCREENING: ICD-10-CM

## 2025-04-30 DIAGNOSIS — K21.9 GASTROESOPHAGEAL REFLUX DISEASE, UNSPECIFIED WHETHER ESOPHAGITIS PRESENT: ICD-10-CM

## 2025-04-30 DIAGNOSIS — R07.89 ATYPICAL CHEST PAIN: ICD-10-CM

## 2025-04-30 DIAGNOSIS — K62.5 BRBPR (BRIGHT RED BLOOD PER RECTUM): Primary | ICD-10-CM

## 2025-04-30 PROCEDURE — 99205 OFFICE O/P NEW HI 60 MIN: CPT | Performed by: STUDENT IN AN ORGANIZED HEALTH CARE EDUCATION/TRAINING PROGRAM

## 2025-04-30 PROCEDURE — 3008F BODY MASS INDEX DOCD: CPT | Performed by: STUDENT IN AN ORGANIZED HEALTH CARE EDUCATION/TRAINING PROGRAM

## 2025-04-30 RX ORDER — SODIUM, POTASSIUM,MAG SULFATES 17.5-3.13G
SOLUTION, RECONSTITUTED, ORAL ORAL
Qty: 2 EACH | Refills: 0 | Status: SHIPPED | OUTPATIENT
Start: 2025-04-30

## 2025-04-30 NOTE — PROGRESS NOTES
Subjective     History of Present Illness:   Barber Agustin is a 29 y.o. male who presents to GI clinic for evaluation of worsening acid reflux associated with a globus sensation.    Patient states he initially noticed burning in his chest 3 years ago.  It was not severe or frequent however over the course of the past few months he has noticed it is more intense.  He did go to the emergency room due to severity of his symptoms last month and was started on Protonix which she has been taking.  He feels this has helped with the intensity of his symptoms however they are still present at least once daily.  His sensation of heartburn is associated with chest discomfort as well as sensation of food being stuck in his mid esophagus which he describes as a globus sensation.    He otherwise has no symptoms of regurgitation or hematemesis.  He has no weight loss.  He has noticed bright red blood per rectum typically when wiping and this occurs once to twice a month.  He usually has 1-2 bowel movements daily.  He does not report any constipation.    He has not had any bidirectional endoscopy in the past.  He has not been on any antiacid therapy aside from Protonix.    In addition to Protonix he has also made lifestyle modifications including avoiding eating meals close to bedtime.  He has also avoided fried food.  He does not drink any carbonated drinks.      Patient reports that he quit vaping 2 weeks ago and has been downtrending on that for the past 6 months due to  health concerns.   He drinks 1 beer every 2 days.    He denies any family history of GI malignancy.    He states he used to smoke tobacco between the ages of 18 and 29 and then switched to vaping which she has now discontinued.    Past Medical History   has a past medical history of Atypical chest pain (03/25/2025) and Palpitations (03/24/2025).     Social History   reports that he has quit smoking. His smoking use included cigarettes. He uses smokeless tobacco.      Family History  family history is not on file.     Allergies  RX Allergies[1]     Medications  Current Outpatient Medications   Medication Instructions    pantoprazole (PROTONIX) 40 mg, oral, Daily, Do not crush, chew, or split.    sertraline (ZOLOFT) 50 mg, Daily        Objective   Visit Vitals  /85   Pulse 72      Physical Exam  Vitals reviewed.   Constitutional:       Appearance: Normal appearance.   HENT:      Head: Normocephalic.      Mouth/Throat:      Mouth: Mucous membranes are moist.   Cardiovascular:      Rate and Rhythm: Normal rate and regular rhythm.   Pulmonary:      Effort: Pulmonary effort is normal.      Breath sounds: Normal breath sounds.   Abdominal:      General: Abdomen is flat.   Neurological:      General: No focal deficit present.      Mental Status: He is alert.   Psychiatric:         Mood and Affect: Mood normal.         Judgment: Judgment normal.         Assessment/Plan   Barber Agustin is a 29 y.o. male who presents to GI clinic for evaluation of worsening acid reflux associated with a globus sensation and extraintestinal symptom of chest pain.    He was previously evaluated for his symptoms of chest pain with cardiology and underwent workup including EKG as well as echocardiogram that were unremarkable.  His most recent evaluation with cardiology was last month with no further plans on cardiac evaluation and was felt his symptoms were due to musculoskeletal issues.    He warrants bidirectional endoscopy to evaluate worsening acid reflux that is now associated with dysphagia as well as bright red blood per rectum.  Differential includes hiatal hernia, gastritis, H. pylori, EOE.  Differential for bright red blood per rectum is most likely rectal outlet bleeding due to hemorrhoids however given this is a red flag sign he warrants ruling out intraluminal pathology.    He will continue to take Protonix however he was counseled on taking this 30 minutes before breakfast rather than  with meals.    He was commended on lifestyle modifications he has made including cutting out vaping as well as avoiding heavy meals before bedtime.    He will return to clinic after endoscopy.        Problem List Items Addressed This Visit       Atypical chest pain    Gastroesophageal reflux disease    Relevant Orders    Esophagogastroduodenoscopy (EGD)    Annual physical exam     Other Visit Diagnoses         BRBPR (bright red blood per rectum)    -  Primary    Relevant Orders    Colonoscopy Diagnostic (BRBPR)                   Paloma Gilmore MD         My final recommendations will be communicated back to the requesting physician by way of shared Medical record or letter to requesting physician via fax.              [1] No Known Allergies

## 2025-05-14 ENCOUNTER — TELEPHONE (OUTPATIENT)
Dept: GASTROENTEROLOGY | Facility: CLINIC | Age: 30
End: 2025-05-14
Payer: COMMERCIAL

## 2025-05-14 ENCOUNTER — ANESTHESIA EVENT (OUTPATIENT)
Dept: GASTROENTEROLOGY | Facility: EXTERNAL LOCATION | Age: 30
End: 2025-05-14

## 2025-05-14 NOTE — TELEPHONE ENCOUNTER
I left a message to call [phone] in regards to his insurance information per SELVIN Mahoney.  Having procedure done with Dr. Gilmore on 5/27/25 in [place].

## 2025-05-17 DIAGNOSIS — K21.9 GASTROESOPHAGEAL REFLUX DISEASE, UNSPECIFIED WHETHER ESOPHAGITIS PRESENT: ICD-10-CM

## 2025-05-19 RX ORDER — PANTOPRAZOLE SODIUM 40 MG/1
40 TABLET, DELAYED RELEASE ORAL DAILY
Qty: 90 TABLET | Refills: 0 | Status: SHIPPED | OUTPATIENT
Start: 2025-05-19

## 2025-05-27 ENCOUNTER — ANESTHESIA (OUTPATIENT)
Dept: GASTROENTEROLOGY | Facility: EXTERNAL LOCATION | Age: 30
End: 2025-05-27

## 2025-05-27 ENCOUNTER — APPOINTMENT (OUTPATIENT)
Dept: GASTROENTEROLOGY | Facility: EXTERNAL LOCATION | Age: 30
End: 2025-05-27

## 2025-06-19 ENCOUNTER — TELEPHONE (OUTPATIENT)
Dept: GASTROENTEROLOGY | Facility: CLINIC | Age: 30
End: 2025-06-19

## 2025-06-19 NOTE — TELEPHONE ENCOUNTER
LVM calling to schedule Egd/ Colon with Dr. Gilmore.           Last time we talked patient was  getting a new insurance plan in June.